# Patient Record
Sex: MALE | Race: WHITE | Employment: UNEMPLOYED | URBAN - METROPOLITAN AREA
[De-identification: names, ages, dates, MRNs, and addresses within clinical notes are randomized per-mention and may not be internally consistent; named-entity substitution may affect disease eponyms.]

---

## 2023-10-05 ENCOUNTER — HOSPITAL ENCOUNTER (INPATIENT)
Age: 38
LOS: 1 days | Discharge: HOME | End: 2023-10-06
Attending: EMERGENCY MEDICINE | Admitting: PSYCHIATRY & NEUROLOGY
Payer: MEDICAID

## 2023-10-05 DIAGNOSIS — F29 PSYCHOSIS, UNSPECIFIED PSYCHOSIS TYPE (HCC): Primary | ICD-10-CM

## 2023-10-05 PROBLEM — F20.0 SCHIZOPHRENIA, PARANOID TYPE (HCC): Status: ACTIVE | Noted: 2023-10-05

## 2023-10-05 LAB
ALBUMIN SERPL-MCNC: 5 G/DL (ref 3.4–5)
ALBUMIN/GLOB SERPL: 2 {RATIO} (ref 1.1–2.2)
ALP SERPL-CCNC: 48 U/L (ref 40–129)
ALT SERPL-CCNC: 31 U/L (ref 10–40)
AMPHETAMINES UR QL SCN>1000 NG/ML: ABNORMAL
ANION GAP SERPL CALCULATED.3IONS-SCNC: 13 MMOL/L (ref 3–16)
APAP SERPL-MCNC: <5 UG/ML (ref 10–30)
AST SERPL-CCNC: 18 U/L (ref 15–37)
BARBITURATES UR QL SCN>200 NG/ML: ABNORMAL
BASOPHILS # BLD: 0.1 K/UL (ref 0–0.2)
BASOPHILS NFR BLD: 0.4 %
BENZODIAZ UR QL SCN>200 NG/ML: ABNORMAL
BILIRUB SERPL-MCNC: 0.7 MG/DL (ref 0–1)
BUN SERPL-MCNC: 18 MG/DL (ref 7–20)
CALCIUM SERPL-MCNC: 9.4 MG/DL (ref 8.3–10.6)
CANNABINOIDS UR QL SCN>50 NG/ML: POSITIVE
CHLORIDE SERPL-SCNC: 103 MMOL/L (ref 99–110)
CO2 SERPL-SCNC: 22 MMOL/L (ref 21–32)
COCAINE UR QL SCN: ABNORMAL
CREAT SERPL-MCNC: 0.8 MG/DL (ref 0.9–1.3)
DEPRECATED RDW RBC AUTO: 12.8 % (ref 12.4–15.4)
DRUG SCREEN COMMENT UR-IMP: ABNORMAL
EKG ATRIAL RATE: 57 BPM
EKG DIAGNOSIS: NORMAL
EKG P AXIS: 26 DEGREES
EKG P-R INTERVAL: 132 MS
EKG Q-T INTERVAL: 422 MS
EKG QRS DURATION: 88 MS
EKG QTC CALCULATION (BAZETT): 410 MS
EKG R AXIS: 29 DEGREES
EKG T AXIS: 26 DEGREES
EKG VENTRICULAR RATE: 57 BPM
EOSINOPHIL # BLD: 0.2 K/UL (ref 0–0.6)
EOSINOPHIL NFR BLD: 1.7 %
ETHANOLAMINE SERPL-MCNC: NORMAL MG/DL (ref 0–0.08)
FENTANYL SCREEN, URINE: ABNORMAL
GFR SERPLBLD CREATININE-BSD FMLA CKD-EPI: >60 ML/MIN/{1.73_M2}
GLUCOSE SERPL-MCNC: 120 MG/DL (ref 70–99)
HCT VFR BLD AUTO: 48.4 % (ref 40.5–52.5)
HGB BLD-MCNC: 16.6 G/DL (ref 13.5–17.5)
LYMPHOCYTES # BLD: 2.9 K/UL (ref 1–5.1)
LYMPHOCYTES NFR BLD: 24.5 %
MAGNESIUM SERPL-MCNC: 2.2 MG/DL (ref 1.8–2.4)
MCH RBC QN AUTO: 31.9 PG (ref 26–34)
MCHC RBC AUTO-ENTMCNC: 34.4 G/DL (ref 31–36)
MCV RBC AUTO: 92.7 FL (ref 80–100)
METHADONE UR QL SCN>300 NG/ML: ABNORMAL
MONOCYTES # BLD: 0.9 K/UL (ref 0–1.3)
MONOCYTES NFR BLD: 7.3 %
NEUTROPHILS # BLD: 7.7 K/UL (ref 1.7–7.7)
NEUTROPHILS NFR BLD: 66.1 %
OPIATES UR QL SCN>300 NG/ML: ABNORMAL
OXYCODONE UR QL SCN: ABNORMAL
PCP UR QL SCN>25 NG/ML: ABNORMAL
PH UR STRIP: 6 [PH]
PLATELET # BLD AUTO: 222 K/UL (ref 135–450)
PMV BLD AUTO: 9.7 FL (ref 5–10.5)
POTASSIUM SERPL-SCNC: 4 MMOL/L (ref 3.5–5.1)
PROT SERPL-MCNC: 7.5 G/DL (ref 6.4–8.2)
RBC # BLD AUTO: 5.21 M/UL (ref 4.2–5.9)
SALICYLATES SERPL-MCNC: <0.3 MG/DL (ref 15–30)
SODIUM SERPL-SCNC: 138 MMOL/L (ref 136–145)
TSH SERPL DL<=0.005 MIU/L-ACNC: 1.04 UIU/ML (ref 0.27–4.2)
WBC # BLD AUTO: 11.7 K/UL (ref 4–11)

## 2023-10-05 PROCEDURE — 99285 EMERGENCY DEPT VISIT HI MDM: CPT

## 2023-10-05 PROCEDURE — 80307 DRUG TEST PRSMV CHEM ANLYZR: CPT

## 2023-10-05 PROCEDURE — 80143 DRUG ASSAY ACETAMINOPHEN: CPT

## 2023-10-05 PROCEDURE — 84443 ASSAY THYROID STIM HORMONE: CPT

## 2023-10-05 PROCEDURE — 82077 ASSAY SPEC XCP UR&BREATH IA: CPT

## 2023-10-05 PROCEDURE — 6370000000 HC RX 637 (ALT 250 FOR IP): Performed by: PSYCHIATRY & NEUROLOGY

## 2023-10-05 PROCEDURE — 80053 COMPREHEN METABOLIC PANEL: CPT

## 2023-10-05 PROCEDURE — 83735 ASSAY OF MAGNESIUM: CPT

## 2023-10-05 PROCEDURE — 93010 ELECTROCARDIOGRAM REPORT: CPT | Performed by: INTERNAL MEDICINE

## 2023-10-05 PROCEDURE — 36415 COLL VENOUS BLD VENIPUNCTURE: CPT

## 2023-10-05 PROCEDURE — 80061 LIPID PANEL: CPT

## 2023-10-05 PROCEDURE — 93005 ELECTROCARDIOGRAM TRACING: CPT | Performed by: EMERGENCY MEDICINE

## 2023-10-05 PROCEDURE — 6360000002 HC RX W HCPCS

## 2023-10-05 PROCEDURE — 83036 HEMOGLOBIN GLYCOSYLATED A1C: CPT

## 2023-10-05 PROCEDURE — 80179 DRUG ASSAY SALICYLATE: CPT

## 2023-10-05 PROCEDURE — 1240000000 HC EMOTIONAL WELLNESS R&B

## 2023-10-05 PROCEDURE — 85025 COMPLETE CBC W/AUTO DIFF WBC: CPT

## 2023-10-05 RX ORDER — NICOTINE 21 MG/24HR
1 PATCH, TRANSDERMAL 24 HOURS TRANSDERMAL DAILY
Status: DISCONTINUED | OUTPATIENT
Start: 2023-10-05 | End: 2023-10-06 | Stop reason: HOSPADM

## 2023-10-05 RX ORDER — HALOPERIDOL 10 MG/1
10 TABLET ORAL EVERY 6 HOURS PRN
Status: DISCONTINUED | OUTPATIENT
Start: 2023-10-05 | End: 2023-10-06 | Stop reason: HOSPADM

## 2023-10-05 RX ORDER — LORAZEPAM 2 MG/1
2 TABLET ORAL EVERY 6 HOURS PRN
Status: DISCONTINUED | OUTPATIENT
Start: 2023-10-05 | End: 2023-10-06 | Stop reason: HOSPADM

## 2023-10-05 RX ORDER — OLANZAPINE 10 MG/1
10 TABLET ORAL EVERY 4 HOURS PRN
Status: DISCONTINUED | OUTPATIENT
Start: 2023-10-05 | End: 2023-10-05

## 2023-10-05 RX ORDER — MAGNESIUM HYDROXIDE/ALUMINUM HYDROXICE/SIMETHICONE 120; 1200; 1200 MG/30ML; MG/30ML; MG/30ML
30 SUSPENSION ORAL EVERY 6 HOURS PRN
Status: DISCONTINUED | OUTPATIENT
Start: 2023-10-05 | End: 2023-10-06 | Stop reason: HOSPADM

## 2023-10-05 RX ORDER — DIPHENHYDRAMINE HYDROCHLORIDE 50 MG/ML
50 INJECTION INTRAMUSCULAR; INTRAVENOUS ONCE
Status: COMPLETED | OUTPATIENT
Start: 2023-10-05 | End: 2023-10-05

## 2023-10-05 RX ORDER — BENZTROPINE MESYLATE 1 MG/ML
2 INJECTION INTRAMUSCULAR; INTRAVENOUS 2 TIMES DAILY PRN
Status: DISCONTINUED | OUTPATIENT
Start: 2023-10-05 | End: 2023-10-06 | Stop reason: HOSPADM

## 2023-10-05 RX ORDER — POLYETHYLENE GLYCOL 3350 17 G
2 POWDER IN PACKET (EA) ORAL
Status: DISCONTINUED | OUTPATIENT
Start: 2023-10-05 | End: 2023-10-06 | Stop reason: HOSPADM

## 2023-10-05 RX ORDER — HALOPERIDOL 5 MG/ML
10 INJECTION INTRAMUSCULAR ONCE
Status: COMPLETED | OUTPATIENT
Start: 2023-10-05 | End: 2023-10-05

## 2023-10-05 RX ORDER — ACETAMINOPHEN 325 MG/1
650 TABLET ORAL EVERY 4 HOURS PRN
Status: DISCONTINUED | OUTPATIENT
Start: 2023-10-05 | End: 2023-10-06 | Stop reason: HOSPADM

## 2023-10-05 RX ORDER — HALOPERIDOL 5 MG/ML
10 INJECTION INTRAMUSCULAR EVERY 6 HOURS PRN
Status: DISCONTINUED | OUTPATIENT
Start: 2023-10-05 | End: 2023-10-06 | Stop reason: HOSPADM

## 2023-10-05 RX ORDER — LORAZEPAM 2 MG/ML
2 INJECTION INTRAMUSCULAR EVERY 6 HOURS PRN
Status: DISCONTINUED | OUTPATIENT
Start: 2023-10-05 | End: 2023-10-06 | Stop reason: HOSPADM

## 2023-10-05 RX ORDER — LORAZEPAM 2 MG/ML
2 INJECTION INTRAMUSCULAR ONCE
Status: COMPLETED | OUTPATIENT
Start: 2023-10-05 | End: 2023-10-05

## 2023-10-05 RX ORDER — IBUPROFEN 400 MG/1
400 TABLET ORAL EVERY 6 HOURS PRN
Status: DISCONTINUED | OUTPATIENT
Start: 2023-10-05 | End: 2023-10-06 | Stop reason: HOSPADM

## 2023-10-05 RX ORDER — ZIPRASIDONE HYDROCHLORIDE 20 MG/1
20 CAPSULE ORAL ONCE
Status: DISCONTINUED | OUTPATIENT
Start: 2023-10-05 | End: 2023-10-06 | Stop reason: HOSPADM

## 2023-10-05 RX ADMIN — HALOPERIDOL LACTATE 10 MG: 5 INJECTION, SOLUTION INTRAMUSCULAR at 12:00

## 2023-10-05 RX ADMIN — LORAZEPAM 2 MG: 2 INJECTION INTRAMUSCULAR; INTRAVENOUS at 12:00

## 2023-10-05 RX ADMIN — ACETAMINOPHEN 650 MG: 325 TABLET ORAL at 11:19

## 2023-10-05 RX ADMIN — NICOTINE POLACRILEX 2 MG: 2 LOZENGE ORAL at 11:20

## 2023-10-05 RX ADMIN — DIPHENHYDRAMINE HYDROCHLORIDE 50 MG: 50 INJECTION INTRAMUSCULAR; INTRAVENOUS at 12:00

## 2023-10-05 ASSESSMENT — SLEEP AND FATIGUE QUESTIONNAIRES
SLEEP PATTERN: UTA
DO YOU USE A SLEEP AID: NO
DO YOU HAVE DIFFICULTY SLEEPING: YES
SLEEP PATTERN: INSOMNIA;DISTURBED/INTERRUPTED SLEEP
SLEEP PATTERN: DISTURBED/INTERRUPTED SLEEP;INSOMNIA;RESTLESSNESS
DO YOU HAVE DIFFICULTY SLEEPING: YES
DO YOU USE A SLEEP AID: NO

## 2023-10-05 ASSESSMENT — ENCOUNTER SYMPTOMS
RESPIRATORY NEGATIVE: 1
GASTROINTESTINAL NEGATIVE: 1
ALLERGIC/IMMUNOLOGIC NEGATIVE: 1
EYES NEGATIVE: 1

## 2023-10-05 ASSESSMENT — PAIN SCALES - GENERAL: PAINLEVEL_OUTOF10: 5

## 2023-10-05 ASSESSMENT — PATIENT HEALTH QUESTIONNAIRE - PHQ9
1. LITTLE INTEREST OR PLEASURE IN DOING THINGS: 0
SUM OF ALL RESPONSES TO PHQ QUESTIONS 1-9: 0
2. FEELING DOWN, DEPRESSED OR HOPELESS: 0
SUM OF ALL RESPONSES TO PHQ QUESTIONS 1-9: 0
SUM OF ALL RESPONSES TO PHQ9 QUESTIONS 1 & 2: 0

## 2023-10-05 ASSESSMENT — LIFESTYLE VARIABLES
HOW MANY STANDARD DRINKS CONTAINING ALCOHOL DO YOU HAVE ON A TYPICAL DAY: PATIENT DOES NOT DRINK
HOW OFTEN DO YOU HAVE A DRINK CONTAINING ALCOHOL: NEVER
HOW MANY STANDARD DRINKS CONTAINING ALCOHOL DO YOU HAVE ON A TYPICAL DAY: PATIENT DOES NOT DRINK
HOW OFTEN DO YOU HAVE A DRINK CONTAINING ALCOHOL: NEVER

## 2023-10-05 ASSESSMENT — PAIN - FUNCTIONAL ASSESSMENT: PAIN_FUNCTIONAL_ASSESSMENT: NONE - DENIES PAIN

## 2023-10-05 ASSESSMENT — PAIN DESCRIPTION - LOCATION: LOCATION: HEAD

## 2023-10-05 ASSESSMENT — PAIN DESCRIPTION - DESCRIPTORS: DESCRIPTORS: ACHING

## 2023-10-05 NOTE — PROGRESS NOTES
Responded to overhead page for code violet. Checked in on Pt to offer support after code team finished working with Pt. Pt stated he was doing okay and had no needs at this time. Assured him of our continued availability for support.     Diana Worthingtonin       10/05/23 1219   Encounter Summary   Encounter Overview/Reason  Crisis   Service Provided For: Patient   Last Encounter    (10/5 code violet, Pt declined  support)   Complexity of Encounter Moderate   Begin Time 1200   End Time  1205   Total Time Calculated 5 min

## 2023-10-05 NOTE — CARE COORDINATION
10/05/23 1407   Psychiatric History   Psychiatric history treatment Current treatment;Psychiatric admissions  (per chart review this is not pt's first hospitalization)   Are there any medication issues? Yes  (per chart review pt stops taking medcations after he is discharged.)   Recent Psychological Experiences Job loss; Financial;Other(comment)  (per chart review pt brought in via police and by mobile crisis. police called to pt's home due to pt praying loudly/throwing things at the wall and punching at wall. recently lost job, financial issues, home is a mess- food all over. denied SI/HI)   Support System   Support system Adequate  (per chart review)   Types of Support System Mother;Sister  (per chart review)   Problems in support system   [place])   Current Living Situation   Home Living Adequate   Living information Lives alone  (per chart review)   Problems with living situation  Yes  (per chart review pt lost job and having financial issues, home is a mess and food all over)   Financial problems per chart review pt lost job due to wearing slippers at work   Lack of basic needs   [place]. per chart review home is a mess and food all over)   [facility]   Other government assistance milena   Problems with environment milena   Current abuse issues milena   Supervised setting None  (per chart review pt lives alone)   Relationship problems   (milena)   Medical and Self-Care Issues   Relevant medical problems milena. per chart review pt had first psychotic episode in 2014 and stops taking medication after discharge. Relevant self-care issues milena   Barriers to treatment Yes  (per chart review pt stops taking medication after discharge.  pt has not outpt therapy)   Family Constellation   Spouse/partner-name/age per chart review pt is    Children-names/ages milena   Parents per chart review parents are living   Siblings per chart review sister Daina Perez services   (per chart review none)   Childhood   Raised by   [place])

## 2023-10-05 NOTE — ED NOTES
Pt transferred to zone B. Pt continues to be elevated. Thoughts are manic and disorganized. Religiously preoccupied. Hot meal and beverage provided. Personal belongings placed in locked locker.       Yovany Anand, RN  10/05/23 0365

## 2023-10-05 NOTE — ED NOTES
Pt gave this writer permission to speak with his sister Mike Elizalde [phone]. She states this is not his first hospitalization. His first psychotic episode happen in 2014. Normally after he is discharged he immediately stops taking whatever medication he was started on. Pt is . There is documented schizophrenia on his fathers side.     Cm Young - Mother  [phone]     Tita Kruse [place]  10/05/23 6882

## 2023-10-05 NOTE — BH NOTE
[facility]  Admission Note     Admission Type:   Admission Type: Involuntary    Reason for admission:  Reason for Admission: Sadaf      Addictive Behavior:   Addictive Behavior  In the Past 3 Months, Have You Felt or Has Someone Told You That You Have a Problem With  : None    Medical Problems:   History reviewed. No pertinent past medical history.     Status EXAM:  Mental Status and Behavioral Exam  Normal: No  Level of Assistance: Independent/Self  Facial Expression: Hostile, Exaggerated  Affect: Unstable  Level of Consciousness: Alert  Frequency of Checks: 4 times per hour, close  Mood:Normal: No  Mood: Labile, Suspicious, Irritable, Angry  Motor Activity:Normal: No  Motor Activity: Increased  Eye Contact: Fair  Observed Behavior: Agitated, Hostile, Impulsive, Hypermobile  Sexual Misconduct History: Current - no  Preception: [place] to person, [place] to place  Attention:Normal: No  Attention: Unable to concentrate, Distractible  Thought Processes: Flight of ideas  Thought Content:Normal: No  Thought Content: Delusions, Paranoia  Depression Symptoms: Increased irritability, Impaired concentration, Change in energy level  Anxiety Symptoms: Generalized  Sadaf Symptoms: Flight of ideas, Labile, Increased energy, Grandiosity, Pressured speech  Hallucinations: Unable to assess  Delusions: Yes  Delusions: Grandeur, Paranoid, Persecutory, Anglican  Memory:Normal: No  Memory: Poor recent, Poor remote  Insight and Judgment: No  Insight and Judgment: Poor judgment, Poor insight, Unrealistic    Tobacco Screening:  Practical Counseling, on admission, rhett X, if applicable and completed (first 3 are required if patient doesn't refuse):            ( ) Recognizing danger situations (included triggers and roadblocks)                    ( ) Coping skills (new ways to manage stress,relaxation techniques, changing routine, distraction)                                                           ( ) Basic information

## 2023-10-05 NOTE — BH NOTE
Pt yelling, using vulgar language. Sexually inappropriate. Pt then threw medication at staff and attempted to tower over staff. Pt then began yelling at another patient making general threats. 1155 code violet called overhead. Omaira Swift PMHNP called and put emergency medication orders in. Benadryl, Haldol and Ativan given IM per order. Pt then walked over to room 5, accompanied by staff. Since he was given the medication, he has quit yelling and is not currently displaying threatening nor aggressive behaviors.

## 2023-10-05 NOTE — VIRTUAL HEALTH
[facility] Assessment       Chief Complaint: \"I'm a f*cking philosopher\". Diagnosis-Unspecified: Schizophrenia, depression    Mental Status Alert. Mood:hostile, suspicious, angry, agitated, and suspicious. Thought process: tangential thought, flight of ideas, and loose associations. Pressured speech. Compulsions. Delusions - paranoid, Scientology, obsession, persecutory, referential. Attention: distractible, hyperalert, unable to concentrate. Voluntary/Involuntary Status:  Involuntary    Guardian/POA: n/a    C-SSRS Risk (High, Moderate, Low): No risk    Psychosis (if present): Patient believes he is a philosopher. \"I'm a Orthodox and a Huel Glenn". \"I'm the biggest hillbilly on f*ucking [place]\" \"We need to get the President of the [place] ([facility]) States here to figure it out\". \"I'm not a pedophile. I'm not toney\". MH & Substance Use Treatment: Patient did not answer the questions regarding any current or past history of MH or PEDROZA treatment when redirected multiple times. Substance Use: Alcohol and marijuana    Trauma/Abuse: Unable to assess. Violence: Patient denies any history towards others. Legal: Denies any legal issues    Access to Weapons: Denies any access to weapons but then stated he has an artifact gun and has a pocket knife at home. Risk Factors: Recently lost his job. Paranoid. Delusional. Lives alone. Financial concerns. Unemployed. White male. Protective Factors: Denies having any history of suicidal ideations. Does not want to die. Has a purpose in life. Support system: Family    Living Situation: Live alone    Education: Did not discuss    Employment: Unemployed    Brief Summary: Myron León is a 45year old single white male who was [facility] ED by police after involvement with the mobile crisis unit. His neighbors were concerned about his behavior - praying loudly and his home is unkept. Patient recently lost his job because he wore slippers to work.  He is actively psychotic. Mental status: Alert. Mood:hostile, suspicious, angry, agitated, and suspicious. Thought process: tangential thought, flight of ideas, and loose associations. Pressured speech. Compulsions. Delusions - paranoid, Zoroastrian, obsession, persecutory, referential. Attention: distractible, hyperalert, unable to concentrate. Patient was difficult to redirect. Patient denies being suicidal or homicidal ideations It was difficult to determine if he has hallucinations. It was difficult to obtain any mental health history or treatment by self-report. No collateral contacts available. Disposition: DEBORAH spoke with Dr. Sonia Lyons extensively after the SW consult was completed. Patient will be admitted for further psychiatric evaluation and treatment. Safety Plan: Patient would not participate in the assessment due to paranoia and delusions. Dionte Crum, was evaluated through a synchronous (real-time) audio-video encounter. The patient (and/or guardian if applicable) is aware that this is a billable service, which includes applicable co-pays. This virtual visit was conducted with patient's (and/or legal guardian's) consent. Patient identification was verified, and a caregiver was present when appropriate. The patient was located at [facility]  The provider was located at [place] [address]     Total time spent on this encounter: 110 minutes    --Vargas Gentile LCSW on 10/5/2023 at 2:35 AM    An electronic signature was used to authenticate this note.

## 2023-10-05 NOTE — BH NOTE
Pt currently in shower. Encouraged pt to rest in bed, he refused. Pt is not currently yelling or acting in a threatening manner.

## 2023-10-05 NOTE — BH NOTE
Most of admission assessments completed. Pt refused 4 eyes skin assessment and physical assessment. Respirations are easy and even, pt appears to be in no physical distress. Pt would not sign admission paperwork at this time.

## 2023-10-05 NOTE — ED NOTES
Pt refuses socks. He states he has a  light inside him to keep him warm and does not need them. This writer explained to him that [facility] prefers for patients to wear socks for safety reasons. He continues to refuse.      Jermaine Modi RN  10/05/23 1024

## 2023-10-05 NOTE — ED NOTES
Pt calling his mother. He continues to be manic and disorganized. Loud at times, redirect easily. \"All I ever get is peanuts, and Im good with peanuts, then all the sudden they give me caramel on them. Odalys Cortes talks about me like Im the trash of the earth. My father said I lifted you out of nothing! He went down with fish stories. Im fucking nuts. And that dude said marijuana, hes dead to them, but Im aloud to talk about him. He's a lab rat. I am Denzel Underwood getting drilled down. \"         Yogesh Shearer, RN  10/05/23 0934

## 2023-10-05 NOTE — ED PROVIDER NOTES
motion. .  Trachea midline. CHEST: No visible deformity  HEART: Regular rate and rhythm. No murmurs, gallops, rubs  LUNGS: There to auscultation bilaterally. No wheezes, rhonchi, or rubs  ABDOMEN: Soft, nontender, nondistended. Negative Torre sign. No tenderness over McBurney's point. No rebound or guarding. No definite masses noted. EXTREMITIES: No clubbing, cyanosis, or edema. No obvious deformities noted  SKIN: Warm, Dry, well-perfused. No rash noted  NEURO: Alert and oriented x3. No gross focal neurologic deficits  PSYCH: Paranoid, hyperreligious, pressured speech, disjointed and loosening of associations. Flight of ideas. Paranoid rambling about the \"Spiritism can whisper into your ear and you will die\". He is mildly redirectable, but then begins rambling again. DIAGNOSTIC RESULTS     EKG: All EKG's are interpreted by the Emergency Department Physician who either signs or Co-signs this chart in the absence of a cardiologist.    EKG on my interpretation shows sinus bradycardia at 57. TX interval 132. QRS 88. . QTc 410. There are no acute ST segment elevations or depressions present. No ischemic changes.       ED BEDSIDE ULTRASOUND:   Performed by ED Physician - none    LABS:  Labs Reviewed   ACETAMINOPHEN LEVEL - Abnormal; Notable for the following components:       Result Value    Acetaminophen Level <5 (*)     All other components within normal limits   CBC WITH AUTO DIFFERENTIAL - Abnormal; Notable for the following components:    WBC 11.7 (*)     All other components within normal limits   COMPREHENSIVE METABOLIC PANEL W/ REFLEX TO MG FOR LOW K - Abnormal; Notable for the following components:    Glucose 120 (*)     Creatinine 0.8 (*)     All other components within normal limits   SALICYLATE LEVEL - Abnormal; Notable for the following components:    Salicylate, Serum <4.4 (*)     All other components within normal limits   ETHANOL   MAGNESIUM   URINE DRUG SCREEN       All other display                (Please note that portions of this note were completed with a voice recognition program.  Efforts were made to edit the dictations but occasionally words are mis-transcribed.)    Nohemi Davies MD (electronically signed)  Attending Emergency Physician            Nohemi Davies MD  10/05/23 0506

## 2023-10-05 NOTE — ED NOTES
Call placed to [facility] spoke to Quita Mejia for psych intake.      Berta Samaniego RN  10/05/23 5220

## 2023-10-05 NOTE — ED NOTES
Pt got upset with his mother on the phone and hung up, a few minutes later his mother called back on the patient phone. Pt answered the phone, \"Hello this is Sol at the . \"  Pt continued to act like 'Sol at the  of [facility], until this writer could get the phone from the patient.      Marilee Winn RN  10/05/23 9869

## 2023-10-05 NOTE — BH NOTE
Uriel Christina took a shower and needed cueing to dry self and aid in putting on a fresh gown. Returned to bed and is resting with eyes closed.

## 2023-10-05 NOTE — BH NOTE
Pt arrived to the unit agitated, transported by this writer and 2 security staff from ED. Yelling and using foul language. Thoughts are disorganized and pt is unable to be redirected.

## 2023-10-06 VITALS
WEIGHT: 198 LBS | BODY MASS INDEX: 26.24 KG/M2 | SYSTOLIC BLOOD PRESSURE: 123 MMHG | RESPIRATION RATE: 18 BRPM | TEMPERATURE: 97.3 F | OXYGEN SATURATION: 98 % | HEART RATE: 78 BPM | HEIGHT: 73 IN | DIASTOLIC BLOOD PRESSURE: 83 MMHG

## 2023-10-06 PROBLEM — Z00.00 ENCOUNTER FOR GENERAL ADULT MEDICAL EXAMINATION W/O ABNORMAL FINDINGS: Status: ACTIVE | Noted: 2023-10-06

## 2023-10-06 LAB
CHOLEST SERPL-MCNC: 179 MG/DL (ref 0–199)
EST. AVERAGE GLUCOSE BLD GHB EST-MCNC: 102.5 MG/DL
HBA1C MFR BLD: 5.2 %
HDLC SERPL-MCNC: 59 MG/DL (ref 40–60)
LDLC SERPL CALC-MCNC: 88 MG/DL
TRIGL SERPL-MCNC: 161 MG/DL (ref 0–150)
VLDLC SERPL CALC-MCNC: 32 MG/DL

## 2023-10-06 PROCEDURE — 99221 1ST HOSP IP/OBS SF/LOW 40: CPT

## 2023-10-06 PROCEDURE — 5130000000 HC BRIDGE APPOINTMENT

## 2023-10-06 NOTE — PLAN OF CARE
Problem: Risk for Elopement  Goal: Patient will not exit the unit/facility without proper excort  10/6/2023 1141 by Iliana Davis RN  Outcome: Adequate for Discharge     Problem: Anxiety  Goal: Will report anxiety at manageable levels  Description: INTERVENTIONS:  1. Administer medication as ordered  2. Teach and rehearse alternative coping skills  3. Provide emotional support with 1:1 interaction with staff  10/6/2023 1141 by Iliana Davis RN  Outcome: Adequate for Discharge     Problem: Decision Making  Goal: Pt/Family able to effectively weigh alternatives and participate in decision making related to treatment and care  Description: INTERVENTIONS:  1. Determine when there are differences between patient's view, family's view, and healthcare provider's view of condition  2. Facilitate patient and family articulation of goals for care  3. Help patient and family identify pros/cons of alternative solutions  4. Provide information as requested by patient/family  5. Respect patient/family right to receive or not to receive information  6. Serve as a liaison between patient and family and health care team  7. Initiate Consults from Ethics, Palliative Care or initiate [address] as is appropriate  10/6/2023 1141 by Iliana Davis RN  Outcome: Adequate for Discharge     Problem: Confusion  Goal: Confusion, delirium, dementia, or psychosis is improved or at baseline  Description: INTERVENTIONS:  1. Assess for possible contributors to thought disturbance, including medications, impaired vision or hearing, underlying metabolic abnormalities, dehydration, psychiatric diagnoses, and notify attending LIP  2. Golden high risk fall precautions, as indicated  3. Provide frequent short contacts to provide reality reorientation, refocusing and direction  4. Decrease environmental stimuli, including noise as appropriate  5.  Monitor and intervene to maintain adequate

## 2023-10-06 NOTE — BH NOTE
Patient will be discharged today per Psychiatrist order. Denies all psychiatric symptoms. States he does not remember much about his behavior yesterday. He states he was able to sleep after receiving IM medication. States he has not used any substances and last used Delta-8 a few weeks ago. He states he feels he has endured past trauma and memories he has suppressed, and when questioning people around him regarding this, he states they are not honest with him. He declines to discuss this potential trauma with this writer. He is pleasant and friendly in conversation. Discussed safety plan with patient. Declines needs or questions at this time.

## 2023-10-06 NOTE — DISCHARGE INSTRUCTIONS
Advanced Directives:  Patient does not have a surrogate decision maker appointed   Name (if yes): N/A Phone Number: N/A  Patient does not have a psychiatric and/ or medical advanced directive or power of . Patient was offered psychiatric and/ or medical advanced directive or power of  information/completion but declined to complete   Why not? N/A    Discharge Planning {IS/IS NOT:19932}Complete. Discharge Date: 10/6/23   Reason for Hospitalization: Forrest Valencia is a 45 y.o. male who presents to the emergency department via police and after evaluation by the mobile crisis unit. Discharge Diagnosis: Psychosis, unspecified psychosis type [facility])  Discharging to: Home     Your instructions: Your physician here was Veronica Lockett MD. If you have any questions please call the unit at [phone] for the adult unit or [phone] for [facility]. Please note that we have a patient family advisory [place] that meets the second Wednesday of January and the second Wednesday of July at [address] [place] [address] in [place] at [facility]. Department leadership would love for you to attend to give feedback on what we are doing well and areas in which we can improve our patient care. Please come if you are able and feel free to reach out to [address] [place] [address] at [phone] with any questions. The crisis number for [facility] is [phone] (SAVE). This crisis line is available 24 hours a day, seven days a week. Please follow up with your PCP regarding any pending labs. You are being referred to [facility]. They provide case management, medication management, and mental health therapy.    Name of Provider: [address] [place] [address]  Provider specialty/license: mental health services  Date and time of appointment: 10/9/23 at 8:30 AM <Suggested Walk-in time   The type/s of services requested are: counseling/medication management  Agency name: Brenton Adam [facility]  Provider specialty/license: mental health services  Date and time of appointment: 10/9/23 at 8:30 AM <Suggested Walk-in time   The type/s of services requested are: counseling/medication management  Agency name: Adam Smiley [facility]  Address: [address] [address], [facility]  Phone Number: [phone]  Special instructions (what to bring to appointment, etc.): Valid ID, insurance card if you have one, proof of residence (mail), proof of income (tax return, check stubs, award letter). If you are unable to attend the open enrollment date and time above please plan to attend open enrollment any Monday-Thursday from 8:30 AM- 12:30 PM    Discharge Completed By: Becka Gillis, MSW   Fax to: Follow up provider name and number  [facility] [phone]    The following personal items were collected during your admission, stored in locker and/or safe, and were returned to you:    Belongings  Dental Appliances: None  Vision - Corrective Lenses: None  Hearing Aid: None  Clothing: Belt, Footwear, Shirt, Pants  Jewelry: None  Body Piercings Removed: N/A  Electronic Devices: Cell Phone  Weapons (Notify Protective Services/Security): None  Other Valuables: Other (Comment) (None)  Home Medications: None  Valuables Given To: Other (Comment) (SAFE)  Provide Name(s) of Who Valuable(s) Were Given To: N/A    By signing below, I understand and acknowledge receipt of the instructions indicated above.

## 2023-10-06 NOTE — H&P
recently reconnected with friends near [facility] where he grew up, began remembering being places, possible \"hazing\" like activities that he was subjected to, but having trouble remembering the full events. Pt has asked friends about this, and they seem very nervous when he asked. Pt is concerned that he went through some kind of trauma and has blocked it from memory. Pt is calm as we spoke today, answered all questions appropriately. Speech is not pressured, no bizarre or inappropriate statements being made. Pt denies all SI/HI and AVH, requesting to return home today with outpatient referrals. He has no interest in medications at this time. Pt was raised in [facility] by parents. Denies any abuse or trauma in childhood. He graduated HS, worked in [employer]. He moved to [facility] for 10 years. He was , now , was finalized several months ago. They had no children. Pt recently moved back to the [facility], new job in the [employer]. Pt lives alone in an apartment. PAST PSYCHIATRIC HISTORY:  Previous inpatient at [address] [place] [address] in 05/2023 for similar presentation. During that admission he spoke about being admitted 3 times at psychiatric facility in [place], [place]., unclear for what reason. He denies any previous suicide attempts, or diagnosis. No current medications or outpatient care. FAMILY PSYCHIATRIC HISTORY:   History reviewed. No pertinent family history. ALLERGIES:  No Known Allergies    CURRENT MEDICATIONS:     ziprasidone  20 mg Oral Once    nicotine  1 patch TransDERmal Daily       PAST MEDICAL HISTORY:  History reviewed. No pertinent past medical history.      PAST SURGICAL HISTORY:    Past Surgical History:   Procedure Laterality Date    BREAST REDUCTION SURGERY Bilateral        PROBLEM LIST:  Principal Problem:    Psychosis, unspecified psychosis type ([address])  Active Problems:    Schizophrenia, paranoid type ([address])    Encounter for general adult sitting, [] recumbent, [] other        Strength/tone:  [x] muscle strength and tone appear consistent with age and condition     [] atrophy      [] abnormal movements  PSYCHIATRIC:    Relatedness:  [x] cooperative, [] guarded, [] indifferent, [] hostile,      [] sedated  Speech:  [x] normal prosody, [] pressured, [] decreased volume,    [] slurred, [] halting, [] slowed, [] other     [] echolalia, [] incoherent, [] stuttering   Eye contact:  [x] direct, [] avoidant, [] intense  Kinetics:  [x] normal, [] increased, [] decreased  Mood:   [x] stable, [] depressed, [] anxious, [] irritable,     [] labile  Affect:   [x] normal range, [] constricted, [] depressed, [] anxious,     [] angry, [] blunted  Hallucinations  [x] denies, [] auditory,  [] visual,  [] olfactory, [] tactile  Delusions  [x] none, [] grandiose,  [] jealous,  [] persecutory,  [] somatic,     [] bizarre  Preoccupations  [x] none, [] violence, [] obsessions, [] other     Suicidal ideation  [x] denies, [] endorses  Homicidal ideation [x] denies, [] endorses  Thought process: [x] logical, [] circumstantial, [] tangential, [] FERMIN,     [] simplistic, [] disorganized  Associations:  [x] logical and coherent, [] loosening, [] disorganized  Insight:   [] good, [x] fair, [] poor  Judgment:  [] good, [x] fair, [] poor  Attention and concentration:     [x] intact, [] limited, [] able to focus on interview,     [] grossly impaired  Orientation:  [x] person, place, time, situation     [] disoriented to:     Memory:  Remote memory [x] intact, [] impaired     Recent memory  [x] intact, [] impaired          IMPRESSION    Principal Problem:    Psychosis, unspecified psychosis type ([address])  Active Problems:    Schizophrenia, paranoid type ([address])    Encounter for general adult medical examination w/o abnormal findings  Resolved Problems:    * No resolved hospital problems.  *       ______      Tx plan:  Prevent self injury, stabilize affect, restore sleep, treat depression,

## 2023-10-06 NOTE — BH NOTE
[facility]  Treatment Team Note  Day 1    Review Date & Time: 10/6/2023  7695    Patient was not in treatment team      Status EXAM:   Mental Status and Behavioral Exam  Normal: Yes  Level of Assistance: Independent/Self  Facial Expression: Brightened  Affect: Congruent  Level of Consciousness: Alert  Frequency of Checks: 4 times per hour, close  Mood:Normal: Yes  Mood:  (euthymic)  Motor Activity:Normal: Yes  Motor Activity: Decreased  Eye Contact: Good  Observed Behavior: Friendly, Cooperative  Sexual Misconduct History: Current - no (no current or hx)  Preception: [place] to person, [place] to time, [place] to place, [place] to situation  Attention:Normal: Yes  Attention: Distractible  Thought Processes: Unremarkable  Thought Content:Normal: Yes  Thought Content: Other (comment) (linear and logical)  Depression Symptoms: No problems reported or observed. Anxiety Symptoms: No problems reported or observed. Sadaf Symptoms: No problems reported or observed. Hallucinations: None  Delusions: No  Delusions:  (milena. pt is sleeping due to pt being medicated)  Memory:Normal: Yes  Memory: Poor recent  Insight and Judgment: Yes (improved)  Insight and Judgment: Poor judgment, Poor insight      Suicide Risk CSSR-S:  1) Within the past month, have you wished you were dead or wished you could go to sleep and not wake up? : No  2) Have you actually had any thoughts of killing yourself? : No  6) Have you ever done anything, started to do anything, or prepared to do anything to end your life?: No      PLAN/TREATMENT RECOMMENDATIONS UPDATE: Patient will take medication as prescribed, eat 75% of meals, attend groups, participate in milieu activities, participate in treatment team and care planning for discharge and follow up. Patient on Statement of Belief. Possible D/C today.      Fran Valadez RN

## 2023-10-06 NOTE — H&P
Hospital Medicine History & Physical      PCP: No primary care provider on file. Date of Admission: 10/5/2023    Date of Service: Pt seen/examined on 10/06/23     Chief Complaint:    Chief Complaint   Patient presents with    Psychiatric Evaluation     Pt arrives via police and placed on SOB by crisis mobile unit. Police were called to pts home due to praying very loudly/throwing things at the walls and punching holes in the wall. Mobile crisis states pt recently lost job due to wearing slippers at work and that pts home is a mess with food everywhere. Pt rambling in triage and praying out to god. Pt denies S/I, H/I at this time. History Of Present Illness: The patient is a 45 y.o. male with no PMH who presented to [facility] ED for psychiatric evaluation. Patient was seen and evaluated in the ED by the ED medical provider, patient was medically cleared for admission to [facility] at [facility]. This note serves as an admission medical H&P. Tobacco use: denies  ETOH use: \"occasionally\"  Illicit drug use: denies    Patient denies any medical complaints. Past Medical History:    History reviewed. No pertinent past medical history. Past Surgical History:        Procedure Laterality Date    BREAST REDUCTION SURGERY Bilateral        Medications Prior to Admission:    Prior to Admission medications    Not on File       Allergies:  Patient has no known allergies. Social History:  The patient currently lives by himself. TOBACCO:   reports that he has been smoking cigarettes. He does not have any smokeless tobacco history on file. ETOH:   reports current alcohol use. Family History:   Positive as follows:    History reviewed. No pertinent family history.     REVIEW OF SYSTEMS:     Constitutional: Negative for fever   HENT: Negative for sore throat   Eyes: Negative for redness   Respiratory: Negative  for dyspnea, cough   Cardiovascular: Negative for chest pain   Gastrointestinal: Negative for vomiting,

## 2023-10-06 NOTE — PROGRESS NOTES
Behavioral Services  Medicare Certification Upon Admission    I certify that this patient's inpatient psychiatric hospital admission is medically necessary for:    [x] (1) Treatment which could reasonably be expected to improve this patient's condition,       [x] (2) Or for diagnostic study;     AND     [x](2) The inpatient psychiatric services are provided while the individual is under the care of a physician and are included in the individualized plan of care.     Estimated length of stay/service 2-5 days    Plan for post-hospital care outpatient services    Electronically signed by KRISHNA Chavez CNP on 10/6/2023 at 8:59 AM

## 2023-10-06 NOTE — BH NOTE
[facility]  Discharge Note    Pt discharged with followings belongings:   Dental Appliances: None  Vision - Corrective Lenses: None  Hearing Aid: None  Jewelry: None  Body Piercings Removed: N/A  Clothing: Belt, Footwear, Shirt, Pants  Other Valuables: Other (Comment) (None)   Valuables sent home with patient or returned to patient. Patient educated on aftercare instructions: yes. Information faxed to [facility] by this writer  at 12:59 PM .Patient verbalize understanding of AVS:  yes. Status EXAM upon discharge:  Mental Status and Behavioral Exam  Normal: Yes  Level of Assistance: Independent/Self  Facial Expression: Brightened  Affect: Congruent  Level of Consciousness: Alert  Frequency of Checks: 4 times per hour, close  Mood:Normal: Yes  Mood:  (euthymic)  Motor Activity:Normal: Yes  Motor Activity: Decreased  Eye Contact: Good  Observed Behavior: Friendly, Cooperative  Sexual Misconduct History: Current - no (no current or hx)  Preception: [place] to person, [place] to time, [place] to place, [place] to situation  Attention:Normal: Yes  Attention: Distractible  Thought Processes: Unremarkable  Thought Content:Normal: Yes  Thought Content: Other (comment) (linear and logical)  Depression Symptoms: No problems reported or observed. Anxiety Symptoms: No problems reported or observed. Sadaf Symptoms: No problems reported or observed.   Hallucinations: None  Delusions: No  Delusions:  (milena. pt is sleeping due to pt being medicated)  Memory:Normal: Yes  Memory: Poor recent  Insight and Judgment: Yes (improved)  Insight and Judgment: Poor judgment, Poor insight    Tobacco Screening:  Practical Counseling, on admission, rhett X, if applicable and completed (first 3 are required if patient doesn't refuse):            (X) Recognizing danger situations (included triggers and roadblocks)                    (X) Coping skills (new ways to manage stress,relaxation techniques, changing routine, distraction)

## 2023-10-06 NOTE — PLAN OF CARE
Clark Lazcano has been withdrawn to his room most of this shift. He was noted to be resting in his bed with his eyes closed, but has emerged from his room to politely request snacks twice. Staff doing rounds noted him to be in the shower early in the shift. That staff member asked if he needed help or assistance in any way, and he declined and thanked the staff member for checking on him. Clark Lazcano denies SI, HI, and AVH. Problem: Risk for Elopement  Goal: Patient will not exit the unit/facility without proper excort  Outcome: Progressing     Problem: Anxiety  Goal: Will report anxiety at manageable levels  Description: INTERVENTIONS:  1. Administer medication as ordered  2. Teach and rehearse alternative coping skills  3. Provide emotional support with 1:1 interaction with staff  Outcome: Progressing     Problem: Decision Making  Goal: Pt/Family able to effectively weigh alternatives and participate in decision making related to treatment and care  Description: INTERVENTIONS:  1. Determine when there are differences between patient's view, family's view, and healthcare provider's view of condition  2. Facilitate patient and family articulation of goals for care  3. Help patient and family identify pros/cons of alternative solutions  4. Provide information as requested by patient/family  5. Respect patient/family right to receive or not to receive information  6. Serve as a liaison between patient and family and health care team  7. Initiate Consults from Ethics, Palliative Care or initiate [id number] as is appropriate  Outcome: Progressing     Problem: Confusion  Goal: Confusion, delirium, dementia, or psychosis is improved or at baseline  Description: INTERVENTIONS:  1. Assess for possible contributors to thought disturbance, including medications, impaired vision or hearing, underlying metabolic abnormalities, dehydration, psychiatric diagnoses, and notify attending LIP  2.  [place] high risk fall

## 2023-10-11 ENCOUNTER — FOLLOWUP TELEPHONE ENCOUNTER (OUTPATIENT)
Dept: PSYCHIATRY | Age: 38
End: 2023-10-11